# Patient Record
Sex: FEMALE | Race: WHITE | NOT HISPANIC OR LATINO | Employment: FULL TIME | URBAN - METROPOLITAN AREA
[De-identification: names, ages, dates, MRNs, and addresses within clinical notes are randomized per-mention and may not be internally consistent; named-entity substitution may affect disease eponyms.]

---

## 2017-04-04 ENCOUNTER — PATIENT MESSAGE (OUTPATIENT)
Dept: ENDOSCOPY | Facility: HOSPITAL | Age: 47
End: 2017-04-04

## 2017-04-12 ENCOUNTER — PATIENT MESSAGE (OUTPATIENT)
Dept: FAMILY MEDICINE | Facility: CLINIC | Age: 47
End: 2017-04-12

## 2017-08-10 LAB — HUMAN PAPILLOMAVIRUS (HPV): NORMAL

## 2017-10-10 ENCOUNTER — PATIENT MESSAGE (OUTPATIENT)
Dept: FAMILY MEDICINE | Facility: CLINIC | Age: 47
End: 2017-10-10

## 2017-10-10 ENCOUNTER — PATIENT MESSAGE (OUTPATIENT)
Dept: ENDOSCOPY | Facility: HOSPITAL | Age: 47
End: 2017-10-10

## 2017-10-10 NOTE — TELEPHONE ENCOUNTER
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   Will she have the diabetes listing removed?  Stephon

## 2018-01-18 ENCOUNTER — PATIENT MESSAGE (OUTPATIENT)
Dept: ADMINISTRATIVE | Facility: HOSPITAL | Age: 48
End: 2018-01-18

## 2018-01-26 ENCOUNTER — OFFICE VISIT (OUTPATIENT)
Dept: FAMILY MEDICINE | Facility: CLINIC | Age: 48
End: 2018-01-26
Payer: COMMERCIAL

## 2018-01-26 VITALS
TEMPERATURE: 98 F | SYSTOLIC BLOOD PRESSURE: 122 MMHG | WEIGHT: 152.56 LBS | HEIGHT: 67 IN | BODY MASS INDEX: 23.94 KG/M2 | HEART RATE: 94 BPM | DIASTOLIC BLOOD PRESSURE: 81 MMHG

## 2018-01-26 DIAGNOSIS — M06.011 RHEUMATOID ARTHRITIS INVOLVING BOTH SHOULDERS WITH NEGATIVE RHEUMATOID FACTOR: ICD-10-CM

## 2018-01-26 DIAGNOSIS — Z00.00 ANNUAL PHYSICAL EXAM: Primary | ICD-10-CM

## 2018-01-26 DIAGNOSIS — M06.012 RHEUMATOID ARTHRITIS INVOLVING BOTH SHOULDERS WITH NEGATIVE RHEUMATOID FACTOR: ICD-10-CM

## 2018-01-26 PROCEDURE — 90715 TDAP VACCINE 7 YRS/> IM: CPT | Mod: S$GLB,,, | Performed by: FAMILY MEDICINE

## 2018-01-26 PROCEDURE — 90471 IMMUNIZATION ADMIN: CPT | Mod: S$GLB,,, | Performed by: FAMILY MEDICINE

## 2018-01-26 PROCEDURE — 99396 PREV VISIT EST AGE 40-64: CPT | Mod: 25,S$GLB,, | Performed by: FAMILY MEDICINE

## 2018-01-26 PROCEDURE — 99999 PR PBB SHADOW E&M-EST. PATIENT-LVL IV: CPT | Mod: PBBFAC,,, | Performed by: FAMILY MEDICINE

## 2018-01-26 RX ORDER — CYCLOBENZAPRINE HCL 10 MG
TABLET ORAL
COMMUNITY
Start: 2017-12-01

## 2018-01-26 RX ORDER — METHOCARBAMOL 750 MG/1
TABLET, FILM COATED ORAL
COMMUNITY
Start: 2017-12-04 | End: 2023-05-05

## 2018-01-26 NOTE — PROGRESS NOTES
Subjective:      Patient ID: Arleen Saavedra is a 47 y.o. female.    Chief Complaint: Annual Exam    HPI  She is here for an annual exam.     Problem List Items Addressed This Visit     Rheumatoid arthritis involving both shoulders with negative rheumatoid factor    Overview     She has seronegative rheumatoid arthritis and fibromyalgia. Reports soreness in the upper shoulders and neck which she has done well within the past with plaquenil which she has been on since around 2010 and is followed by Dr. Martin.               Other Visit Diagnoses     Annual physical exam    -  Primary          Health Maintenance Due   Topic Date Due    TETANUS VACCINE  07/14/1989    Lipid Panel  01/15/2018       Past Medical History:  Past Medical History:   Diagnosis Date    Fibromyalgia     Rheumatoid arthritis     Rheumatoid arthritis involving both shoulders with negative rheumatoid factor 1/26/2018    She has seronegative rheumatoid arthritis and fibromyalgia. Reports soreness in the upper shoulders and neck which she has done well within the past with plaquenil which she has been on since around 2010 and is followed by Dr. Martin.       History reviewed. No pertinent surgical history.  Review of patient's allergies indicates:  No Known Allergies  Current Outpatient Prescriptions on File Prior to Visit   Medication Sig Dispense Refill    hydroxychloroquine (PLAQUENIL) 200 mg tablet Take 1 tablet (200 mg total) by mouth 2 (two) times daily. 60 tablet 0     No current facility-administered medications on file prior to visit.      Social History     Social History    Marital status:      Spouse name: N/A    Number of children: N/A    Years of education: N/A     Occupational History    Not on file.     Social History Main Topics    Smoking status: Never Smoker    Smokeless tobacco: Never Used    Alcohol use Not on file    Drug use: Unknown    Sexual activity: Not on file     Other Topics Concern     "Not on file     Social History Narrative    No narrative on file     Family History   Problem Relation Age of Onset    Stroke Mother     Hypertension Mother     Seizures Mother     Coronary artery disease Father     Hyperlipidemia Father     Hypertension Maternal Grandfather     Diabetes Neg Hx     Cancer Neg Hx              Review of Systems   Constitutional: Negative for activity change and unexpected weight change.   HENT: Negative for hearing loss, rhinorrhea and trouble swallowing.    Eyes: Negative for discharge and visual disturbance.   Respiratory: Negative for chest tightness and wheezing.    Cardiovascular: Negative for chest pain and palpitations.   Gastrointestinal: Negative for blood in stool, constipation, diarrhea and vomiting.   Endocrine: Negative for polydipsia and polyuria.   Genitourinary: Negative for difficulty urinating, dysuria, hematuria and menstrual problem.   Musculoskeletal: Positive for arthralgias. Negative for joint swelling and neck pain.   Neurological: Negative for weakness and headaches.   Psychiatric/Behavioral: Negative for confusion and dysphoric mood.       Objective:   /81   Pulse 94   Temp 98.4 °F (36.9 °C) (Oral)   Ht 5' 7" (1.702 m)   Wt 69.2 kg (152 lb 8.9 oz)   BMI 23.89 kg/m²     Physical Exam   Constitutional: She appears well-developed and well-nourished. She is cooperative.   HENT:   Head: Normocephalic and atraumatic.   Right Ear: Tympanic membrane, external ear and ear canal normal.   Left Ear: Tympanic membrane, external ear and ear canal normal.   Nose: Nose normal.   Mouth/Throat: Uvula is midline and mucous membranes are normal. No oral lesions. No oropharyngeal exudate, posterior oropharyngeal edema or posterior oropharyngeal erythema.   Eyes: EOM and lids are normal. Pupils are equal, round, and reactive to light. Right eye exhibits no discharge. Left eye exhibits no discharge. Right conjunctiva is not injected. Right conjunctiva has no " hemorrhage. Left conjunctiva is not injected. Left conjunctiva has no hemorrhage. No scleral icterus. Right eye exhibits no nystagmus. Left eye exhibits no nystagmus.   Neck: Normal range of motion and full passive range of motion without pain. Neck supple. No JVD present. No tracheal tenderness present. Carotid bruit is not present. No tracheal deviation present. No thyroid mass and no thyromegaly present.   Cardiovascular: Normal rate, regular rhythm, S1 normal and S2 normal.    No murmur heard.  Pulses:       Carotid pulses are 2+ on the right side, and 2+ on the left side.       Radial pulses are 2+ on the right side, and 2+ on the left side.        Posterior tibial pulses are 2+ on the right side, and 2+ on the left side.   Pulmonary/Chest: Effort normal and breath sounds normal. No respiratory distress. She has no wheezes. She has no rhonchi. She has no rales.   Abdominal: Soft. Normal appearance, normal aorta and bowel sounds are normal. She exhibits no distension, no abdominal bruit, no pulsatile midline mass and no mass. There is no hepatosplenomegaly. There is no tenderness. There is no rebound.   Musculoskeletal:        Right knee: She exhibits no swelling. No tenderness found.        Left knee: She exhibits no swelling. No tenderness found.   Lymphadenopathy:        Head (right side): No submental and no submandibular adenopathy present.        Head (left side): No submental and no submandibular adenopathy present.     She has no cervical adenopathy.   Neurological: She is alert. She has normal strength. No cranial nerve deficit or sensory deficit.   Skin: Skin is warm and dry. No rash noted. No cyanosis. Nails show no clubbing.   Psychiatric: She has a normal mood and affect. Her speech is normal and behavior is normal. Thought content normal. Cognition and memory are normal.       Assessment:     1. Annual physical exam    2. Rheumatoid arthritis involving both shoulders with negative rheumatoid factor         Plan:   Arleen was seen today for annual exam.    Diagnoses and all orders for this visit:    Annual physical exam  -     Tdap Vaccine    Rheumatoid arthritis involving both shoulders with negative rheumatoid factor    she is going to get a lipid panel done at [facility].

## 2018-02-02 LAB
CHOLEST SERPL-MSCNC: 128 MG/DL (ref 0–200)
HDL/CHOLESTEROL RATIO: 2.3 % (ref 0–4.5)
HDLC SERPL-MCNC: 56 MG/DL (ref 35–70)
LDLC SERPL CALC-MCNC: 64 MG/DL (ref 0–109)
TRIGL SERPL-MCNC: 38 MG/DL (ref 0–150)

## 2019-03-11 ENCOUNTER — OFFICE VISIT (OUTPATIENT)
Dept: FAMILY MEDICINE | Facility: CLINIC | Age: 49
End: 2019-03-11
Payer: COMMERCIAL

## 2019-03-11 VITALS
TEMPERATURE: 99 F | BODY MASS INDEX: 25.39 KG/M2 | WEIGHT: 158 LBS | DIASTOLIC BLOOD PRESSURE: 79 MMHG | SYSTOLIC BLOOD PRESSURE: 130 MMHG | HEART RATE: 100 BPM | HEIGHT: 66 IN

## 2019-03-11 DIAGNOSIS — M06.011 RHEUMATOID ARTHRITIS INVOLVING BOTH SHOULDERS WITH NEGATIVE RHEUMATOID FACTOR: ICD-10-CM

## 2019-03-11 DIAGNOSIS — Z00.00 ANNUAL PHYSICAL EXAM: Primary | ICD-10-CM

## 2019-03-11 DIAGNOSIS — M06.012 RHEUMATOID ARTHRITIS INVOLVING BOTH SHOULDERS WITH NEGATIVE RHEUMATOID FACTOR: ICD-10-CM

## 2019-03-11 PROCEDURE — 99999 PR PBB SHADOW E&M-EST. PATIENT-LVL III: ICD-10-PCS | Mod: PBBFAC,,, | Performed by: FAMILY MEDICINE

## 2019-03-11 PROCEDURE — 99999 PR PBB SHADOW E&M-EST. PATIENT-LVL III: CPT | Mod: PBBFAC,,, | Performed by: FAMILY MEDICINE

## 2019-03-11 PROCEDURE — 99396 PREV VISIT EST AGE 40-64: CPT | Mod: S$GLB,,, | Performed by: FAMILY MEDICINE

## 2019-03-11 PROCEDURE — 99396 PR PREVENTIVE VISIT,EST,40-64: ICD-10-PCS | Mod: S$GLB,,, | Performed by: FAMILY MEDICINE

## 2019-03-11 NOTE — PROGRESS NOTES
Subjective:      Patient ID: Arleen Saavedra is a 48 y.o. female.    Chief Complaint: Annual Exam    HPI  She is here for an annual exam.     Problem List Items Addressed This Visit     None      she has been following with Dr. Martin for her seronegative RA. She has been stable with him and she is not having to use steroids.    The current medication list that we have since it was last reconciled is as follows:  Current Outpatient Medications on File Prior to Visit   Medication Sig Dispense Refill    cyclobenzaprine (FLEXERIL) 10 MG tablet TAKE 1 TABLET NIGHTLY      hydroxychloroquine (PLAQUENIL) 200 mg tablet Take 1 tablet (200 mg total) by mouth 2 (two) times daily. 60 tablet 0    methocarbamol (ROBAXIN) 750 MG Tab        No current facility-administered medications on file prior to visit.      The above are being given by him.   I reivewed her last cmp and lipid and cbc.  Her LDL was great last year.      Health Maintenance Due   Topic Date Due    Lipid Panel  01/15/2018       Past Medical History:  Past Medical History:   Diagnosis Date    Fibromyalgia     Rheumatoid arthritis     Rheumatoid arthritis involving both shoulders with negative rheumatoid factor 1/26/2018    She has seronegative rheumatoid arthritis and fibromyalgia. Reports soreness in the upper shoulders and neck which she has done well within the past with plaquenil which she has been on since around 2010 and is followed by Dr. Martin.       History reviewed. No pertinent surgical history.  Review of patient's allergies indicates:  No Known Allergies  Current Outpatient Medications on File Prior to Visit   Medication Sig Dispense Refill    cyclobenzaprine (FLEXERIL) 10 MG tablet TAKE 1 TABLET NIGHTLY      hydroxychloroquine (PLAQUENIL) 200 mg tablet Take 1 tablet (200 mg total) by mouth 2 (two) times daily. 60 tablet 0    methocarbamol (ROBAXIN) 750 MG Tab        No current facility-administered medications on file prior to  "visit.      Social History     Socioeconomic History    Marital status:      Spouse name: Not on file    Number of children: Not on file    Years of education: Not on file    Highest education level: Not on file   Social Needs    Financial resource strain: Not on file    Food insecurity - worry: Not on file    Food insecurity - inability: Not on file    Transportation needs - medical: Not on file    Transportation needs - non-medical: Not on file   Occupational History    Not on file   Tobacco Use    Smoking status: Never Smoker    Smokeless tobacco: Never Used   Substance and Sexual Activity    Alcohol use: Not on file    Drug use: Not on file    Sexual activity: Not on file   Other Topics Concern    Not on file   Social History Narrative    Not on file     Family History   Problem Relation Age of Onset    Stroke Mother     Hypertension Mother     Seizures Mother     Coronary artery disease Father     Hyperlipidemia Father     Hypertension Maternal Grandfather     Diabetes Neg Hx     Cancer Neg Hx              Review of Systems   Constitutional: Negative for activity change and unexpected weight change.   HENT: Negative for hearing loss, rhinorrhea and trouble swallowing.    Eyes: Negative for discharge and visual disturbance.   Respiratory: Negative for chest tightness and wheezing.    Cardiovascular: Negative for chest pain and palpitations.   Gastrointestinal: Negative for blood in stool, constipation, diarrhea and vomiting.   Endocrine: Negative for polydipsia and polyuria.   Genitourinary: Negative for difficulty urinating, dysuria, hematuria and menstrual problem.   Musculoskeletal: Positive for arthralgias. Negative for joint swelling and neck pain.   Neurological: Negative for weakness and headaches.   Psychiatric/Behavioral: Negative for confusion and dysphoric mood.       Objective:   /79   Pulse 100   Temp 98.5 °F (36.9 °C) (Oral)   Ht 5' 6" (1.676 m)   Wt 71.7 " kg (158 lb)   LMP 02/23/2019 (Exact Date)   BMI 25.50 kg/m²     Physical Exam   Constitutional: She appears well-developed and well-nourished. She is cooperative.   HENT:   Head: Normocephalic and atraumatic.   Right Ear: Tympanic membrane, external ear and ear canal normal.   Left Ear: Tympanic membrane, external ear and ear canal normal.   Nose: Nose normal.   Mouth/Throat: Uvula is midline and mucous membranes are normal. No oral lesions. No oropharyngeal exudate, posterior oropharyngeal edema or posterior oropharyngeal erythema.   Eyes: EOM and lids are normal. Pupils are equal, round, and reactive to light. Right eye exhibits no discharge. Left eye exhibits no discharge. Right conjunctiva is not injected. Right conjunctiva has no hemorrhage. Left conjunctiva is not injected. Left conjunctiva has no hemorrhage. No scleral icterus. Right eye exhibits no nystagmus. Left eye exhibits no nystagmus.   Neck: Normal range of motion and full passive range of motion without pain. Neck supple. No JVD present. No tracheal tenderness present. Carotid bruit is not present. No tracheal deviation present. No thyroid mass and no thyromegaly present.   Cardiovascular: Normal rate, regular rhythm, S1 normal and S2 normal.   No murmur heard.  Pulses:       Carotid pulses are 2+ on the right side, and 2+ on the left side.       Radial pulses are 2+ on the right side, and 2+ on the left side.        Posterior tibial pulses are 2+ on the right side, and 2+ on the left side.   Pulmonary/Chest: Effort normal and breath sounds normal. No respiratory distress. She has no wheezes. She has no rhonchi. She has no rales.   Abdominal: Soft. Normal appearance, normal aorta and bowel sounds are normal. She exhibits no distension, no abdominal bruit, no pulsatile midline mass and no mass. There is no hepatosplenomegaly. There is no tenderness. There is no rebound.   Musculoskeletal:        Right knee: She exhibits no swelling. No tenderness  found.        Left knee: She exhibits no swelling. No tenderness found.   Lymphadenopathy:        Head (right side): No submental and no submandibular adenopathy present.        Head (left side): No submental and no submandibular adenopathy present.     She has no cervical adenopathy.   Neurological: She is alert. She has normal strength. No cranial nerve deficit or sensory deficit.   Skin: Skin is warm and dry. No rash noted. No cyanosis. Nails show no clubbing.   Psychiatric: She has a normal mood and affect. Her speech is normal and behavior is normal. Thought content normal. Cognition and memory are normal.       Assessment:     1. Annual physical exam    2. Rheumatoid arthritis involving both shoulders with negative rheumatoid factor        Plan:   Arleen was seen today for annual exam.    Diagnoses and all orders for this visit:    Annual physical exam    Rheumatoid arthritis involving both shoulders with negative rheumatoid factor    I do not feel that she needs any blood at this point as her last lipid was wonderful and her electrolytes and cbc were good in December. She is not due for any shots. She is going to get her pap report that was done in January.

## 2020-09-04 DIAGNOSIS — Z12.39 BREAST CANCER SCREENING: ICD-10-CM

## 2020-10-06 ENCOUNTER — PATIENT MESSAGE (OUTPATIENT)
Dept: ADMINISTRATIVE | Facility: HOSPITAL | Age: 50
End: 2020-10-06

## 2021-03-25 ENCOUNTER — PATIENT MESSAGE (OUTPATIENT)
Dept: ADMINISTRATIVE | Facility: HOSPITAL | Age: 51
End: 2021-03-25

## 2021-06-25 ENCOUNTER — OFFICE VISIT (OUTPATIENT)
Dept: FAMILY MEDICINE | Facility: CLINIC | Age: 51
End: 2021-06-25
Payer: COMMERCIAL

## 2021-06-25 ENCOUNTER — PATIENT OUTREACH (OUTPATIENT)
Dept: ADMINISTRATIVE | Facility: HOSPITAL | Age: 51
End: 2021-06-25

## 2021-06-25 VITALS
TEMPERATURE: 98 F | SYSTOLIC BLOOD PRESSURE: 135 MMHG | HEART RATE: 99 BPM | HEIGHT: 66 IN | DIASTOLIC BLOOD PRESSURE: 81 MMHG | BODY MASS INDEX: 25.23 KG/M2 | WEIGHT: 157 LBS

## 2021-06-25 DIAGNOSIS — Z00.00 ANNUAL PHYSICAL EXAM: Primary | ICD-10-CM

## 2021-06-25 DIAGNOSIS — M85.80 OSTEOPENIA, UNSPECIFIED LOCATION: ICD-10-CM

## 2021-06-25 DIAGNOSIS — Z13.220 ENCOUNTER FOR LIPID SCREENING FOR CARDIOVASCULAR DISEASE: ICD-10-CM

## 2021-06-25 DIAGNOSIS — Z13.6 ENCOUNTER FOR LIPID SCREENING FOR CARDIOVASCULAR DISEASE: ICD-10-CM

## 2021-06-25 DIAGNOSIS — R00.0 TACHYCARDIA: ICD-10-CM

## 2021-06-25 PROBLEM — M89.9 OSTEOPATHIA: Status: ACTIVE | Noted: 2021-06-25

## 2021-06-25 PROCEDURE — 99396 PREV VISIT EST AGE 40-64: CPT | Mod: S$GLB,,, | Performed by: FAMILY MEDICINE

## 2021-06-25 PROCEDURE — 3008F PR BODY MASS INDEX (BMI) DOCUMENTED: ICD-10-PCS | Mod: CPTII,S$GLB,, | Performed by: FAMILY MEDICINE

## 2021-06-25 PROCEDURE — 3008F BODY MASS INDEX DOCD: CPT | Mod: CPTII,S$GLB,, | Performed by: FAMILY MEDICINE

## 2021-06-25 PROCEDURE — 99999 PR PBB SHADOW E&M-EST. PATIENT-LVL III: ICD-10-PCS | Mod: PBBFAC,,, | Performed by: FAMILY MEDICINE

## 2021-06-25 PROCEDURE — 1126F AMNT PAIN NOTED NONE PRSNT: CPT | Mod: S$GLB,,, | Performed by: FAMILY MEDICINE

## 2021-06-25 PROCEDURE — 99999 PR PBB SHADOW E&M-EST. PATIENT-LVL III: CPT | Mod: PBBFAC,,, | Performed by: FAMILY MEDICINE

## 2021-06-25 PROCEDURE — 99396 PR PREVENTIVE VISIT,EST,40-64: ICD-10-PCS | Mod: S$GLB,,, | Performed by: FAMILY MEDICINE

## 2021-06-25 PROCEDURE — 1126F PR PAIN SEVERITY QUANTIFIED, NO PAIN PRESENT: ICD-10-PCS | Mod: S$GLB,,, | Performed by: FAMILY MEDICINE

## 2021-07-02 ENCOUNTER — PATIENT MESSAGE (OUTPATIENT)
Dept: FAMILY MEDICINE | Facility: CLINIC | Age: 51
End: 2021-07-02

## 2021-07-13 ENCOUNTER — PATIENT MESSAGE (OUTPATIENT)
Dept: FAMILY MEDICINE | Facility: CLINIC | Age: 51
End: 2021-07-13

## 2021-07-14 ENCOUNTER — PATIENT MESSAGE (OUTPATIENT)
Dept: FAMILY MEDICINE | Facility: CLINIC | Age: 51
End: 2021-07-14

## 2021-07-19 ENCOUNTER — PATIENT MESSAGE (OUTPATIENT)
Dept: FAMILY MEDICINE | Facility: CLINIC | Age: 51
End: 2021-07-19

## 2021-07-30 ENCOUNTER — PATIENT MESSAGE (OUTPATIENT)
Dept: FAMILY MEDICINE | Facility: CLINIC | Age: 51
End: 2021-07-30

## 2022-03-18 ENCOUNTER — PATIENT MESSAGE (OUTPATIENT)
Dept: ADMINISTRATIVE | Facility: HOSPITAL | Age: 52
End: 2022-03-18
Payer: COMMERCIAL

## 2022-04-25 ENCOUNTER — PATIENT MESSAGE (OUTPATIENT)
Dept: ADMINISTRATIVE | Facility: HOSPITAL | Age: 52
End: 2022-04-25
Payer: COMMERCIAL

## 2022-04-25 DIAGNOSIS — Z12.11 SCREENING FOR COLON CANCER: ICD-10-CM

## 2022-04-27 ENCOUNTER — PATIENT MESSAGE (OUTPATIENT)
Dept: ADMINISTRATIVE | Facility: HOSPITAL | Age: 52
End: 2022-04-27
Payer: COMMERCIAL

## 2022-05-18 LAB — HEMOCCULT STL QL IA: NEGATIVE

## 2022-05-25 DIAGNOSIS — Z12.31 OTHER SCREENING MAMMOGRAM: ICD-10-CM

## 2022-05-26 ENCOUNTER — PATIENT MESSAGE (OUTPATIENT)
Dept: FAMILY MEDICINE | Facility: CLINIC | Age: 52
End: 2022-05-26
Payer: COMMERCIAL

## 2022-07-08 ENCOUNTER — PATIENT MESSAGE (OUTPATIENT)
Dept: FAMILY MEDICINE | Facility: CLINIC | Age: 52
End: 2022-07-08
Payer: COMMERCIAL

## 2022-09-09 ENCOUNTER — OFFICE VISIT (OUTPATIENT)
Dept: FAMILY MEDICINE | Facility: CLINIC | Age: 52
End: 2022-09-09
Payer: COMMERCIAL

## 2022-09-09 VITALS
WEIGHT: 156.75 LBS | HEIGHT: 66 IN | DIASTOLIC BLOOD PRESSURE: 75 MMHG | TEMPERATURE: 98 F | HEART RATE: 84 BPM | SYSTOLIC BLOOD PRESSURE: 123 MMHG | BODY MASS INDEX: 25.19 KG/M2

## 2022-09-09 DIAGNOSIS — Z00.00 ANNUAL PHYSICAL EXAM: Primary | ICD-10-CM

## 2022-09-09 PROCEDURE — 3008F PR BODY MASS INDEX (BMI) DOCUMENTED: ICD-10-PCS | Mod: CPTII,S$GLB,, | Performed by: FAMILY MEDICINE

## 2022-09-09 PROCEDURE — 3078F PR MOST RECENT DIASTOLIC BLOOD PRESSURE < 80 MM HG: ICD-10-PCS | Mod: CPTII,S$GLB,, | Performed by: FAMILY MEDICINE

## 2022-09-09 PROCEDURE — 3008F BODY MASS INDEX DOCD: CPT | Mod: CPTII,S$GLB,, | Performed by: FAMILY MEDICINE

## 2022-09-09 PROCEDURE — 99999 PR PBB SHADOW E&M-EST. PATIENT-LVL III: ICD-10-PCS | Mod: PBBFAC,,, | Performed by: FAMILY MEDICINE

## 2022-09-09 PROCEDURE — 1159F MED LIST DOCD IN RCRD: CPT | Mod: CPTII,S$GLB,, | Performed by: FAMILY MEDICINE

## 2022-09-09 PROCEDURE — 99396 PREV VISIT EST AGE 40-64: CPT | Mod: S$GLB,,, | Performed by: FAMILY MEDICINE

## 2022-09-09 PROCEDURE — 3074F SYST BP LT 130 MM HG: CPT | Mod: CPTII,S$GLB,, | Performed by: FAMILY MEDICINE

## 2022-09-09 PROCEDURE — 3074F PR MOST RECENT SYSTOLIC BLOOD PRESSURE < 130 MM HG: ICD-10-PCS | Mod: CPTII,S$GLB,, | Performed by: FAMILY MEDICINE

## 2022-09-09 PROCEDURE — 99999 PR PBB SHADOW E&M-EST. PATIENT-LVL III: CPT | Mod: PBBFAC,,, | Performed by: FAMILY MEDICINE

## 2022-09-09 PROCEDURE — 99396 PR PREVENTIVE VISIT,EST,40-64: ICD-10-PCS | Mod: S$GLB,,, | Performed by: FAMILY MEDICINE

## 2022-09-09 PROCEDURE — 1159F PR MEDICATION LIST DOCUMENTED IN MEDICAL RECORD: ICD-10-PCS | Mod: CPTII,S$GLB,, | Performed by: FAMILY MEDICINE

## 2022-09-09 PROCEDURE — 3078F DIAST BP <80 MM HG: CPT | Mod: CPTII,S$GLB,, | Performed by: FAMILY MEDICINE

## 2022-09-09 RX ORDER — DICLOFENAC SODIUM 30 MG/G
GEL TOPICAL
COMMUNITY
Start: 2022-07-06

## 2022-09-09 RX ORDER — IBUPROFEN 800 MG/1
1 TABLET ORAL EVERY 8 HOURS PRN
COMMUNITY
Start: 2021-01-24

## 2022-09-09 NOTE — PROGRESS NOTES
Subjective:      Patient ID: Arleen Saavedra is a 52 y.o. female.    Chief Complaint: Annual Exam  She is here for a phsyical. She is still seeing her rheumatologist at [facility].  She is going to get her pap from Dr. Hernandez in [place] and we will request a report. She has her mammograms at [facility] and will get that to me.   Problem List Items Addressed This Visit    None  Visit Diagnoses       Annual physical exam    -  Primary    Relevant Orders    Hepatitis C Antibody    HIV 1/2 Ag/Ab (4th Gen)    Lipid Panel            The patient's Health Maintenance was reviewed and the following appears to be due:   Health Maintenance Due   Topic Date Due    Hepatitis C Screening  Never done    HIV Screening  Never done    Shingles Vaccine (1 of 2) Never done    Mammogram  05/14/2022    Cervical Cancer Screening  08/10/2022    Influenza Vaccine (1) 09/01/2022       Past Medical History:  Past Medical History:   Diagnosis Date    Darier disease     Fibromyalgia     Rheumatoid arthritis     Rheumatoid arthritis involving both shoulders with negative rheumatoid factor 01/26/2018    She has seronegative rheumatoid arthritis and fibromyalgia. Reports soreness in the upper shoulders and neck which she has done well within the past with plaquenil which she has been on since around 2010 and is followed by Dr. Martin.       History reviewed. No pertinent surgical history.  Review of patient's allergies indicates:  No Known Allergies  Current Outpatient Medications on File Prior to Visit   Medication Sig Dispense Refill    APPLE CIDER VINEGAR ORAL Take 500 mg by mouth 2 (two) times daily.      calcium carb/vit D3/minerals (CALTRATE 600+D PLUS MINERALS ORAL)       cyclobenzaprine (FLEXERIL) 10 MG tablet TAKE 1 TABLET NIGHTLY      diclofenac sodium (SOLARAZE) 3 % gel SMARTSIG:Sparingly Topical Twice Daily      hydroxychloroquine (PLAQUENIL) 200 mg tablet Take 1 tablet (200 mg total) by mouth 2 (two) times daily. 60 tablet 0     "ibuprofen (ADVIL,MOTRIN) 800 MG tablet Take 1 tablet by mouth every 8 (eight) hours as needed.      methocarbamol (ROBAXIN) 750 MG Tab        No current facility-administered medications on file prior to visit.     Social History     Socioeconomic History    Marital status:    Tobacco Use    Smoking status: Never    Smokeless tobacco: Never     Family History   Problem Relation Age of Onset    Stroke Mother     Hypertension Mother     Seizures Mother     Coronary artery disease Father     Hyperlipidemia Father     Hypertension Maternal Grandfather     Diabetes Neg Hx     Cancer Neg Hx        Review of Systems   Constitutional:  Negative for activity change and unexpected weight change.   HENT:  Negative for hearing loss, rhinorrhea and trouble swallowing.    Eyes:  Negative for discharge and visual disturbance.   Respiratory:  Negative for chest tightness and wheezing.    Cardiovascular:  Negative for chest pain and palpitations.   Gastrointestinal:  Negative for blood in stool, constipation, diarrhea and vomiting.   Endocrine: Negative for polydipsia and polyuria.   Genitourinary:  Negative for difficulty urinating, dysuria, hematuria and menstrual problem.   Musculoskeletal:  Positive for arthralgias (she has been in massage for this and she is going to go to a chirpractor but she may consider going to PT for dry needling and will contact me in the future about this.). Negative for joint swelling and neck pain.   Neurological:  Negative for weakness and headaches.   Psychiatric/Behavioral:  Negative for confusion and dysphoric mood.      Objective:   /75   Pulse 84   Temp 98 °F (36.7 °C) (Oral)   Ht 5' 6" (1.676 m)   Wt 71.1 kg (156 lb 12 oz)   BMI 25.30 kg/m²     Physical Exam  Constitutional:       Appearance: Normal appearance. She is well-developed.   HENT:      Head: Normocephalic and atraumatic.      Right Ear: Tympanic membrane, ear canal and external ear normal.      Left Ear: Tympanic " membrane, ear canal and external ear normal.      Nose: Nose normal.      Mouth/Throat:      Mouth: No oral lesions.      Pharynx: Uvula midline. No oropharyngeal exudate or posterior oropharyngeal erythema.   Eyes:      General: Lids are normal. No scleral icterus.        Right eye: No discharge.         Left eye: No discharge.      Extraocular Movements:      Right eye: No nystagmus.      Left eye: No nystagmus.      Conjunctiva/sclera:      Right eye: Right conjunctiva is not injected. No hemorrhage.     Left eye: Left conjunctiva is not injected. No hemorrhage.     Pupils: Pupils are equal, round, and reactive to light.   Neck:      Thyroid: No thyroid mass or thyromegaly.      Vascular: No carotid bruit or JVD.      Trachea: No tracheal tenderness or tracheal deviation.   Cardiovascular:      Rate and Rhythm: Normal rate and regular rhythm.      Pulses:           Carotid pulses are 2+ on the right side and 2+ on the left side.       Radial pulses are 2+ on the right side and 2+ on the left side.        Posterior tibial pulses are 2+ on the right side and 2+ on the left side.      Heart sounds: S1 normal and S2 normal. No murmur heard.  Pulmonary:      Effort: Pulmonary effort is normal. No respiratory distress.      Breath sounds: Normal breath sounds. No wheezing, rhonchi or rales.   Abdominal:      General: Bowel sounds are normal. There is no distension or abdominal bruit.      Palpations: Abdomen is soft. There is no mass or pulsatile mass.      Tenderness: There is no abdominal tenderness. There is no rebound.   Musculoskeletal:      Cervical back: Full passive range of motion without pain, normal range of motion and neck supple.      Right hip: Normal range of motion.      Left hip: Normal range of motion.      Right knee: No swelling. No tenderness.      Left knee: No swelling. No tenderness.   Lymphadenopathy:      Head:      Right side of head: No submental or submandibular adenopathy.      Left side  of head: No submental or submandibular adenopathy.      Cervical: No cervical adenopathy.   Skin:     General: Skin is warm and dry.      Findings: No rash.      Nails: There is no clubbing.   Neurological:      Mental Status: She is alert.      Cranial Nerves: No cranial nerve deficit.      Sensory: No sensory deficit.   Psychiatric:         Speech: Speech normal.         Behavior: Behavior normal. Behavior is cooperative.         Thought Content: Thought content normal.     Assessment:     1. Annual physical exam      Plan:   I am having Arleen Saavedra maintain her hydrOXYchloroQUINE, cyclobenzaprine, methocarbamoL, APPLE CIDER VINEGAR ORAL, calcium carb/vit D3/minerals (CALTRATE 600+D PLUS MINERALS ORAL), diclofenac sodium, and ibuprofen.  Problem List Items Addressed This Visit    None  Visit Diagnoses       Annual physical exam    -  Primary    Relevant Orders    Hepatitis C Antibody    HIV 1/2 Ag/Ab (4th Gen)    Lipid Panel          Follow up for get pap report..    Arleen was seen today for annual exam.    Diagnoses and all orders for this visit:    Annual physical exam  -     Hepatitis C Antibody; Future  -     HIV 1/2 Ag/Ab (4th Gen); Future  -     Lipid Panel; Future  -     Hepatitis C Antibody  -     HIV 1/2 Ag/Ab (4th Gen)  -     Lipid Panel         The patient was instructed to stop the following meds:  There are no discontinued medications.  Orders Placed This Encounter   Procedures    Hepatitis C Antibody     Standing Status:   Future     Number of Occurrences:   1     Standing Expiration Date:   9/9/2023     Order Specific Question:   Release to patient     Answer:   Immediate    HIV 1/2 Ag/Ab (4th Gen)     Standing Status:   Future     Number of Occurrences:   1     Standing Expiration Date:   11/9/2023    Lipid Panel     Standing Status:   Future     Number of Occurrences:   1     Standing Expiration Date:   9/9/2023       Medication List with Changes/Refills   Current Medications    APPLE  CIDER VINEGAR ORAL    Take 500 mg by mouth 2 (two) times daily.    CALCIUM CARB/VIT D3/MINERALS (CALTRATE 600+D PLUS MINERALS ORAL)        CYCLOBENZAPRINE (FLEXERIL) 10 MG TABLET    TAKE 1 TABLET NIGHTLY    DICLOFENAC SODIUM (SOLARAZE) 3 % GEL    SMARTSIG:Sparingly Topical Twice Daily    HYDROXYCHLOROQUINE (PLAQUENIL) 200 MG TABLET    Take 1 tablet (200 mg total) by mouth 2 (two) times daily.    IBUPROFEN (ADVIL,MOTRIN) 800 MG TABLET    Take 1 tablet by mouth every 8 (eight) hours as needed.    METHOCARBAMOL (ROBAXIN) 750 MG TAB          Medication List with Changes/Refills   Current Medications    APPLE CIDER VINEGAR ORAL    Take 500 mg by mouth 2 (two) times daily.       Start Date: --        End Date: --    CALCIUM CARB/VIT D3/MINERALS (CALTRATE 600+D PLUS MINERALS ORAL)           Start Date: 6/1/2021  End Date: --    CYCLOBENZAPRINE (FLEXERIL) 10 MG TABLET    TAKE 1 TABLET NIGHTLY       Start Date: 12/1/2017 End Date: --    DICLOFENAC SODIUM (SOLARAZE) 3 % GEL    SMARTSIG:Sparingly Topical Twice Daily       Start Date: 7/6/2022  End Date: --    HYDROXYCHLOROQUINE (PLAQUENIL) 200 MG TABLET    Take 1 tablet (200 mg total) by mouth 2 (two) times daily.       Start Date: 4/24/2015 End Date: --    IBUPROFEN (ADVIL,MOTRIN) 800 MG TABLET    Take 1 tablet by mouth every 8 (eight) hours as needed.       Start Date: 1/24/2021 End Date: --    METHOCARBAMOL (ROBAXIN) 750 MG TAB           Start Date: 12/4/2017 End Date: --

## 2022-09-12 ENCOUNTER — PATIENT MESSAGE (OUTPATIENT)
Dept: FAMILY MEDICINE | Facility: CLINIC | Age: 52
End: 2022-09-12
Payer: COMMERCIAL

## 2022-09-16 LAB
HEP C VIRUS AB: NON REACTIVE
HIV AG/AB 4TH GEN: NON REACTIVE

## 2022-09-17 ENCOUNTER — PATIENT MESSAGE (OUTPATIENT)
Dept: FAMILY MEDICINE | Facility: CLINIC | Age: 52
End: 2022-09-17
Payer: COMMERCIAL

## 2022-09-19 NOTE — TELEPHONE ENCOUNTER
The hepatitis C was negative.   The HiV is also negative.   The lipid panel is in control also.  These are all good numbers!    Lipid Panel  Specimen:  Blood - Blood specimen (specimen)   Ref Range & Units 2 d ago   Cholesterol 0 - 199 mg/dL 162    Triglyceride 0 - 199 mg/dL 52    HDL 29 - 89 mg/dL 66    LDL 0 - 109 mg/dL 86    Chol/HDL Ratio 0.0 - 4.5 2.5

## 2022-10-04 ENCOUNTER — PATIENT MESSAGE (OUTPATIENT)
Dept: ADMINISTRATIVE | Facility: HOSPITAL | Age: 52
End: 2022-10-04
Payer: COMMERCIAL

## 2022-12-23 ENCOUNTER — PATIENT MESSAGE (OUTPATIENT)
Dept: FAMILY MEDICINE | Facility: CLINIC | Age: 52
End: 2022-12-23
Payer: COMMERCIAL

## 2022-12-28 LAB — PAP RECOMMENDATION EXT: NORMAL

## 2023-01-04 LAB — BCS RECOMMENDATION EXT: NORMAL

## 2023-01-09 ENCOUNTER — PATIENT MESSAGE (OUTPATIENT)
Dept: FAMILY MEDICINE | Facility: CLINIC | Age: 53
End: 2023-01-09
Payer: COMMERCIAL

## 2023-01-09 RX ORDER — BACLOFEN 10 MG/1
10 TABLET ORAL 3 TIMES DAILY
COMMUNITY
Start: 2022-12-12 | End: 2023-07-07 | Stop reason: SDUPTHER

## 2023-01-09 NOTE — TELEPHONE ENCOUNTER
Mammo Digital CAD Screening    Anatomical Region Laterality Modality   Breast bilateral Mammography     Narrative    REASON FOR EXAM: [Z12.31]-Encounter for screening mammogram for malignant   neoplasm of breast     TECHNICAL FACTORS: Digital mammography with tomosynthesis was performed of   the breasts in the mediolateral oblique and craniocaudal views. CAD was   utilized. Exaggerated lateral craniocaudal views were obtained of the   breasts.     CLINICAL INFORMATION: This is a female patient for screening mammogram.   According to the [employer] [place] De[place] Model risk assessment   tool, her lifetime breast cancer risk is 9% .     COMPARISON: May 14, 2021 and February 4, 2019     FINDINGS: The breasts are heterogeneously dense, which may obscure small   masses. There is no evidence of suspicious mass, calcifications or   architectural distortion. Benign intramammary lymph node is present in the   left lateral breast.     IMPRESSION:   BI-RADS 2 - Benign     No mammographic findings of malignancy are identified. Annual mammography   is recommended.     BREAST DENSITY: Heterogeneously Dense     The patient has been entered into our radiology information system, and   she will receive notification approximately 30 days prior to the due date   of her next annual screening mammogram.     Electronically signed by Jose Saavedra MD on 1/5/2023 4:42 PM  Exam End: 01/04/23 15:42    Specimen Collected: 01/05/23 16:40 Last Resulted: 01/05/23 16:42   Received From: [facility]  Result Received: 01/09/23 11:50

## 2023-01-25 ENCOUNTER — PATIENT MESSAGE (OUTPATIENT)
Dept: ADMINISTRATIVE | Facility: HOSPITAL | Age: 53
End: 2023-01-25
Payer: COMMERCIAL

## 2023-01-25 NOTE — LETTER
AUTHORIZATION FOR RELEASE OF   CONFIDENTIAL INFORMATION    Dear Eliezer Simms II, MD,    We are seeing Arleen Saavedra, date of birth 1970, in the clinic at [facility]. Stephon Srinivasan MD is the patient's PCP. Arleen Saavedra has an outstanding lab/procedure at the time we reviewed her chart. In order to help keep her health information updated, she has authorized us to request the following medical record(s):        (  )  MAMMOGRAM                                      (  )  COLONOSCOPY      ( x )  PAP SMEAR                                          (  )  OUTSIDE LAB RESULTS     (  )  DEXA SCAN                                          (  )  EYE EXAM            (  )  FOOT EXAM                                          (  )  ENTIRE RECORD     (  )  OUTSIDE IMMUNIZATIONS                 (  )  _______________         Please fax records to [facility], Ted J Hudspeth, MD, [phone].     If you have any questions, please contact   Shreya SHARP LPN   Care Coordination   [facility]  Phone [phone]      Patient Name: Arleen Saavedra  : 1970  MRN [id number]  Patient Phone #: [phone]

## 2023-01-26 ENCOUNTER — PATIENT MESSAGE (OUTPATIENT)
Dept: ADMINISTRATIVE | Facility: HOSPITAL | Age: 53
End: 2023-01-26
Payer: COMMERCIAL

## 2023-04-15 ENCOUNTER — PATIENT MESSAGE (OUTPATIENT)
Dept: ADMINISTRATIVE | Facility: HOSPITAL | Age: 53
End: 2023-04-15
Payer: COMMERCIAL

## 2023-07-08 ENCOUNTER — PATIENT MESSAGE (OUTPATIENT)
Dept: ADMINISTRATIVE | Facility: HOSPITAL | Age: 53
End: 2023-07-08
Payer: COMMERCIAL

## 2023-07-09 DIAGNOSIS — Z12.11 SCREENING FOR COLON CANCER: ICD-10-CM

## 2023-08-16 LAB — HEMOCCULT STL QL IA: NEGATIVE

## 2024-05-08 ENCOUNTER — OFFICE VISIT (OUTPATIENT)
Dept: FAMILY MEDICINE | Facility: CLINIC | Age: 54
End: 2024-05-08
Payer: COMMERCIAL

## 2024-05-08 VITALS
SYSTOLIC BLOOD PRESSURE: 124 MMHG | HEIGHT: 66 IN | BODY MASS INDEX: 21.05 KG/M2 | HEART RATE: 87 BPM | DIASTOLIC BLOOD PRESSURE: 76 MMHG | WEIGHT: 131 LBS | RESPIRATION RATE: 14 BRPM

## 2024-05-08 DIAGNOSIS — Z00.00 ANNUAL PHYSICAL EXAM: Primary | ICD-10-CM

## 2024-05-08 PROCEDURE — 3074F SYST BP LT 130 MM HG: CPT | Mod: CPTII,S$GLB,, | Performed by: FAMILY MEDICINE

## 2024-05-08 PROCEDURE — 1159F MED LIST DOCD IN RCRD: CPT | Mod: CPTII,S$GLB,, | Performed by: FAMILY MEDICINE

## 2024-05-08 PROCEDURE — 99396 PREV VISIT EST AGE 40-64: CPT | Mod: S$GLB,,, | Performed by: FAMILY MEDICINE

## 2024-05-08 PROCEDURE — 99999 PR PBB SHADOW E&M-EST. PATIENT-LVL IV: CPT | Mod: PBBFAC,,, | Performed by: FAMILY MEDICINE

## 2024-05-08 PROCEDURE — 3008F BODY MASS INDEX DOCD: CPT | Mod: CPTII,S$GLB,, | Performed by: FAMILY MEDICINE

## 2024-05-08 PROCEDURE — 3078F DIAST BP <80 MM HG: CPT | Mod: CPTII,S$GLB,, | Performed by: FAMILY MEDICINE

## 2024-05-08 RX ORDER — NAPROXEN SODIUM 220 MG/1
81 TABLET, FILM COATED ORAL DAILY
COMMUNITY

## 2024-05-08 NOTE — PROGRESS NOTES
Subjective:      Patient ID: Arleen Saavedra is a 53 y.o. female.    Chief Complaint: Annual Exam    Problem List Items Addressed This Visit    None  Visit Diagnoses       Annual physical exam    -  Primary    Relevant Orders    Cologuard Screening (Multitarget Stool DNA)        She did recently get diagnosed with ductal carcinoma of the right breast.  She is planning her MRI and will pick a surgeon soon.      The patient's Health Maintenance was reviewed and the following appears to be due:   Health Maintenance Due   Topic Date Due    Shingles Vaccine (1 of 2) Never done    COVID-19 Vaccine (1 - 2023-24 season) Never done    Colorectal Cancer Screening  08/08/2024       Past Medical History:  Past Medical History:   Diagnosis Date    Darier disease     Fibromyalgia     Rheumatoid arthritis     Rheumatoid arthritis involving both shoulders with negative rheumatoid factor 01/26/2018    She has seronegative rheumatoid arthritis and fibromyalgia. Reports soreness in the upper shoulders and neck which she has done well within the past with plaquenil which she has been on since around 2010 and is followed by Dr. Martin.       No past surgical history on file.  Review of patient's allergies indicates:  No Known Allergies  Current Outpatient Medications on File Prior to Visit   Medication Sig Dispense Refill    aspirin 81 MG Chew Take 81 mg by mouth once daily.      baclofen (LIORESAL) 10 MG tablet Take 1 tablet by mouth 3 (three) times daily.      bimatoprost (LATISSE) 0.03 % ophthalmic solution Place 1 Application into both eyes every evening. Place one drop on applicator and apply evenly along the skin of the upper eyelid at base of eyelashes once daily at bedtime; repeat procedure for second eye (use a clean applicator). 5 mL 1    calcium carb/vit D3/minerals (CALTRATE 600+D PLUS MINERALS ORAL)       clindamycin (CLEOCIN T) 1 % external solution Apply topically 2 (two) times daily. Spot treatment 60 mL 1     cyclobenzaprine (FLEXERIL) 10 MG tablet TAKE 1 TABLET NIGHTLY      diclofenac sodium (SOLARAZE) 3 % gel SMARTSIG:Sparingly Topical Twice Daily      hydroxychloroquine (PLAQUENIL) 200 mg tablet Take 200 mg by mouth 2 (two) times daily.      ibuprofen (ADVIL,MOTRIN) 800 MG tablet Take 1 tablet by mouth every 8 (eight) hours as needed.      Lacto no.76/Bifido/FOS/larch (WOMEN'S PROBIOTIC ORAL)       methylcellulose, laxative, (CITRUCEL) 500 mg Tab       RESTASIS 0.05 % ophthalmic emulsion Place 1 drop into both eyes 2 (two) times daily.      spironolactone (ALDACTONE) 100 MG tablet Take 1 tablet (100 mg total) by mouth once daily. 90 tablet 3    tretinoin (RETIN-A) 0.025 % cream Apply pea sized amount to entire face QHS as tolerated. 45 g 6     No current facility-administered medications on file prior to visit.     Social History     Socioeconomic History    Marital status:    Tobacco Use    Smoking status: Former     Types: Cigarettes    Smokeless tobacco: Never     Social Determinants of Health     Financial Resource Strain: Low Risk  (5/6/2024)    Overall Financial Resource Strain (CARDIA)     Difficulty of Paying Living Expenses: Not hard at all   Food Insecurity: No Food Insecurity (5/6/2024)    Hunger Vital Sign     Worried About Running Out of Food in the Last Year: Never true     Ran Out of Food in the Last Year: Never true   Transportation Needs: No Transportation Needs (5/6/2024)    PRAPARE - Transportation     Lack of Transportation (Medical): No     Lack of Transportation (Non-Medical): No   Physical Activity: Insufficiently Active (5/6/2024)    Exercise Vital Sign     Days of Exercise per Week: 3 days     Minutes of Exercise per Session: 30 min   Stress: Stress Concern Present (5/6/2024)    [employer] [place] [employer] - Occupational Stress Questionnaire     Feeling of Stress : To some extent   Housing Stability: Unknown (5/6/2024)    Housing Stability Vital Sign     Unable to Pay for  "Housing in the Last Year: No     Family History   Problem Relation Name Age of Onset    Stroke Mother      Hypertension Mother      Seizures Mother      Coronary artery disease Father      Hyperlipidemia Father      Hypertension Maternal Grandfather      Diabetes Neg Hx      Cancer Neg Hx         Review of Systems   Constitutional:  Negative for activity change and unexpected weight change.   HENT:  Negative for hearing loss, rhinorrhea and trouble swallowing.    Eyes:  Negative for discharge and visual disturbance.   Respiratory:  Negative for chest tightness and wheezing.    Cardiovascular:  Negative for chest pain and palpitations.   Gastrointestinal:  Negative for blood in stool, constipation, diarrhea and vomiting.   Endocrine: Negative for polydipsia and polyuria.   Genitourinary:  Negative for difficulty urinating, dysuria, hematuria and menstrual problem.   Musculoskeletal:  Negative for arthralgias, joint swelling and neck pain.   Neurological:  Negative for weakness and headaches.   Psychiatric/Behavioral:  Negative for confusion and dysphoric mood.      Objective:   /76   Pulse 87   Resp 14   Ht 5' 6" (1.676 m)   Wt 59.4 kg (131 lb)   BMI 21.14 kg/m²     Physical Exam  Constitutional:       Appearance: Normal appearance. She is well-developed.   HENT:      Head: Normocephalic and atraumatic.      Right Ear: Tympanic membrane, ear canal and external ear normal.      Left Ear: Tympanic membrane, ear canal and external ear normal.      Nose: Nose normal.      Mouth/Throat:      Mouth: No oral lesions.      Pharynx: Uvula midline. No oropharyngeal exudate or posterior oropharyngeal erythema.   Eyes:      General: Lids are normal. No scleral icterus.        Right eye: No discharge.         Left eye: No discharge.      Extraocular Movements:      Right eye: No nystagmus.      Left eye: No nystagmus.      Conjunctiva/sclera:      Right eye: Right conjunctiva is not injected. No hemorrhage.     Left " eye: Left conjunctiva is not injected. No hemorrhage.     Pupils: Pupils are equal, round, and reactive to light.   Neck:      Thyroid: No thyroid mass or thyromegaly.      Vascular: No carotid bruit or JVD.      Trachea: No tracheal tenderness or tracheal deviation.   Cardiovascular:      Rate and Rhythm: Normal rate and regular rhythm.      Pulses:           Carotid pulses are 2+ on the right side and 2+ on the left side.       Radial pulses are 2+ on the right side and 2+ on the left side.        Posterior tibial pulses are 2+ on the right side and 2+ on the left side.      Heart sounds: S1 normal and S2 normal. No murmur heard.  Pulmonary:      Effort: Pulmonary effort is normal. No respiratory distress.      Breath sounds: Normal breath sounds. No wheezing, rhonchi or rales.   Abdominal:      General: Bowel sounds are normal. There is no distension or abdominal bruit.      Palpations: Abdomen is soft. There is no mass or pulsatile mass.      Tenderness: There is no abdominal tenderness. There is no rebound.   Musculoskeletal:      Cervical back: Full passive range of motion without pain, normal range of motion and neck supple.      Right knee: No swelling. No tenderness.      Left knee: No swelling. No tenderness.   Lymphadenopathy:      Head:      Right side of head: No submental or submandibular adenopathy.      Left side of head: No submental or submandibular adenopathy.      Cervical: No cervical adenopathy.   Skin:     General: Skin is warm and dry.      Findings: No rash.      Nails: There is no clubbing.   Neurological:      Mental Status: She is alert.      Cranial Nerves: No cranial nerve deficit.      Sensory: No sensory deficit.   Psychiatric:         Speech: Speech normal.         Behavior: Behavior normal. Behavior is cooperative.         Thought Content: Thought content normal.     I reviewed all labs from [facility] that were done recently. She does not need a lipid as her last one was very well  controlled.  We can recheck in a couple of years.  Assessment:     1. Annual physical exam      Plan:   I am having Arleen Saavedra maintain her cyclobenzaprine, calcium carb/vit D3/minerals (CALTRATE 600+D PLUS MINERALS ORAL), diclofenac sodium, ibuprofen, CitruceL, baclofen, hydroxychloroquine, RESTASIS, spironolactone, tretinoin, clindamycin, bimatoprost, Lacto no.76/Bifido/FOS/larch (WOMEN'S PROBIOTIC ORAL), and aspirin.  No problem-specific Assessment & Plan notes found for this encounter.      No follow-ups on file.    Arleen was seen today for annual exam.    Diagnoses and all orders for this visit:    Annual physical exam  -     Cologuard Screening (Multitarget Stool DNA); Future  -     Cologuard Screening (Multitarget Stool DNA)         The patient was instructed to stop the following meds:  There are no discontinued medications.  Orders Placed This Encounter   Procedures    Cologuard Screening (Multitarget Stool DNA)     Standing Status:   Future     Number of Occurrences:   1     Standing Expiration Date:   8/6/2025       Medication List with Changes/Refills   Current Medications    ASPIRIN 81 MG CHEW    Take 81 mg by mouth once daily.    BACLOFEN (LIORESAL) 10 MG TABLET    Take 1 tablet by mouth 3 (three) times daily.    BIMATOPROST (LATISSE) 0.03 % OPHTHALMIC SOLUTION    Place 1 Application into both eyes every evening. Place one drop on applicator and apply evenly along the skin of the upper eyelid at base of eyelashes once daily at bedtime; repeat procedure for second eye (use a clean applicator).    CALCIUM CARB/VIT D3/MINERALS (CALTRATE 600+D PLUS MINERALS ORAL)        CLINDAMYCIN (CLEOCIN T) 1 % EXTERNAL SOLUTION    Apply topically 2 (two) times daily. Spot treatment    CYCLOBENZAPRINE (FLEXERIL) 10 MG TABLET    TAKE 1 TABLET NIGHTLY    DICLOFENAC SODIUM (SOLARAZE) 3 % GEL    SMARTSIG:Sparingly Topical Twice Daily    HYDROXYCHLOROQUINE (PLAQUENIL) 200 MG TABLET    Take 200 mg by mouth 2 (two)  times daily.    IBUPROFEN (ADVIL,MOTRIN) 800 MG TABLET    Take 1 tablet by mouth every 8 (eight) hours as needed.    LACTO NO.76/BIFIDO/FOS/LARCH (WOMEN'S PROBIOTIC ORAL)        METHYLCELLULOSE, LAXATIVE, (CITRUCEL) 500 MG TAB        RESTASIS 0.05 % OPHTHALMIC EMULSION    Place 1 drop into both eyes 2 (two) times daily.    SPIRONOLACTONE (ALDACTONE) 100 MG TABLET    Take 1 tablet (100 mg total) by mouth once daily.    TRETINOIN (RETIN-A) 0.025 % CREAM    Apply pea sized amount to entire face QHS as tolerated.      Medication List with Changes/Refills   Current Medications    ASPIRIN 81 MG CHEW    Take 81 mg by mouth once daily.       Start Date: --        End Date: --    BACLOFEN (LIORESAL) 10 MG TABLET    Take 1 tablet by mouth 3 (three) times daily.       Start Date: 5/5/2023  End Date: --    BIMATOPROST (LATISSE) 0.03 % OPHTHALMIC SOLUTION    Place 1 Application into both eyes every evening. Place one drop on applicator and apply evenly along the skin of the upper eyelid at base of eyelashes once daily at bedtime; repeat procedure for second eye (use a clean applicator).       Start Date: 1/5/2024  End Date: --    CALCIUM CARB/VIT D3/MINERALS (CALTRATE 600+D PLUS MINERALS ORAL)           Start Date: 6/1/2021  End Date: --    CLINDAMYCIN (CLEOCIN T) 1 % EXTERNAL SOLUTION    Apply topically 2 (two) times daily. Spot treatment       Start Date: 1/5/2024  End Date: --    CYCLOBENZAPRINE (FLEXERIL) 10 MG TABLET    TAKE 1 TABLET NIGHTLY       Start Date: 12/1/2017 End Date: --    DICLOFENAC SODIUM (SOLARAZE) 3 % GEL    SMARTSIG:Sparingly Topical Twice Daily       Start Date: 7/6/2022  End Date: --    HYDROXYCHLOROQUINE (PLAQUENIL) 200 MG TABLET    Take 200 mg by mouth 2 (two) times daily.       Start Date: --        End Date: --    IBUPROFEN (ADVIL,MOTRIN) 800 MG TABLET    Take 1 tablet by mouth every 8 (eight) hours as needed.       Start Date: 1/24/2021 End Date: --    LACTO NO.76/BIFIDO/FOS/LARCH (WOMEN'S PROBIOTIC  ORAL)           Start Date: 2/7/2024  End Date: --    METHYLCELLULOSE, LAXATIVE, (CITRUCEL) 500 MG TAB           Start Date: 5/22/2023 End Date: --    RESTASIS 0.05 % OPHTHALMIC EMULSION    Place 1 drop into both eyes 2 (two) times daily.       Start Date: 11/8/2023 End Date: --    SPIRONOLACTONE (ALDACTONE) 100 MG TABLET    Take 1 tablet (100 mg total) by mouth once daily.       Start Date: 1/5/2024  End Date: 1/4/2025    TRETINOIN (RETIN-A) 0.025 % CREAM    Apply pea sized amount to entire face QHS as tolerated.       Start Date: 1/5/2024  End Date: --

## 2024-05-29 ENCOUNTER — PATIENT MESSAGE (OUTPATIENT)
Dept: FAMILY MEDICINE | Facility: CLINIC | Age: 54
End: 2024-05-29
Payer: COMMERCIAL

## 2024-05-29 DIAGNOSIS — Z12.11 COLON CANCER SCREENING: Primary | ICD-10-CM

## 2024-06-13 ENCOUNTER — OFFICE VISIT (OUTPATIENT)
Dept: SURGERY | Facility: CLINIC | Age: 54
End: 2024-06-13
Payer: COMMERCIAL

## 2024-06-13 DIAGNOSIS — D05.11 BREAST NEOPLASM, TIS (DCIS), RIGHT: Primary | ICD-10-CM

## 2024-06-13 PROCEDURE — 99205 OFFICE O/P NEW HI 60 MIN: CPT | Mod: S$GLB,,, | Performed by: SURGERY

## 2024-06-13 NOTE — PROGRESS NOTES
Breast Surgical Oncology  [place]    Date of Service: 2024    SUBJECTIVE:   Chief complaint: right breast cancer    HISTORY OF PRESENT ILLNESS:   Arleen Saavedra is a 53 y.o. female who is kindly referred by Dr. Stephon Srinivasan for right breast cancer.    A right breast abnormality was identified on routine screening mammography.  Focused mammographic evaluation at [facility] in 2024 revealed pleomorphic calcifications in the right lateral breast posterior depth. Stereotactic biopsy confirmed hormone receptor positive noninvasive breast cancer. She denies breast concerns such as pain, masses, skin changes, nipple discharge, nipple retraction or lumps under the arm.  She denies prior breast surgery or biopsy.     Her breast cancer risk factor profile is as follows: Menarche at 15, Menopause at N/A.  She is . Age at first live birth was 31. Family history of cancer is as follows: none reported    FAMILY HISTORY:     Family History   Problem Relation Name Age of Onset    Stroke Mother      Hypertension Mother      Seizures Mother      Coronary artery disease Father      Hyperlipidemia Father      Hypertension Maternal Grandfather      Diabetes Neg Hx      Cancer Neg Hx          PAST MEDICAL HISTORY:     Past Medical History:   Diagnosis Date    Darier disease     Fibromyalgia     Rheumatoid arthritis     Rheumatoid arthritis involving both shoulders with negative rheumatoid factor 2018    She has seronegative rheumatoid arthritis and fibromyalgia. Reports soreness in the upper shoulders and neck which she has done well within the past with plaquenil which she has been on since around  and is followed by Dr. Martin.         SURGICAL HISTORY:   No past surgical history on file.    SOCIAL HISTORY:     Social History     Tobacco Use    Smoking status: Former     Types: Cigarettes    Smokeless tobacco: Never        MEDICATIONS/ALLERGIES:     Current Outpatient Medications:     aspirin 81  MG Chew, Take 81 mg by mouth once daily., Disp: , Rfl:     baclofen (LIORESAL) 10 MG tablet, Take 1 tablet by mouth 3 (three) times daily., Disp: , Rfl:     bimatoprost (LATISSE) 0.03 % ophthalmic solution, Place 1 Application into both eyes every evening. Place one drop on applicator and apply evenly along the skin of the upper eyelid at base of eyelashes once daily at bedtime; repeat procedure for second eye (use a clean applicator)., Disp: 5 mL, Rfl: 1    calcium carb/vit D3/minerals (CALTRATE 600+D PLUS MINERALS ORAL), , Disp: , Rfl:     clindamycin (CLEOCIN T) 1 % external solution, Apply topically 2 (two) times daily. Spot treatment, Disp: 60 mL, Rfl: 1    cyclobenzaprine (FLEXERIL) 10 MG tablet, TAKE 1 TABLET NIGHTLY, Disp: , Rfl:     diclofenac sodium (SOLARAZE) 3 % gel, SMARTSIG:Sparingly Topical Twice Daily, Disp: , Rfl:     hydroxychloroquine (PLAQUENIL) 200 mg tablet, Take 200 mg by mouth 2 (two) times daily., Disp: , Rfl:     ibuprofen (ADVIL,MOTRIN) 800 MG tablet, Take 1 tablet by mouth every 8 (eight) hours as needed., Disp: , Rfl:     Lacto no.76/Bifido/FOS/larch (WOMEN'S PROBIOTIC ORAL), , Disp: , Rfl:     methylcellulose, laxative, (CITRUCEL) 500 mg Tab, , Disp: , Rfl:     RESTASIS 0.05 % ophthalmic emulsion, Place 1 drop into both eyes 2 (two) times daily., Disp: , Rfl:     spironolactone (ALDACTONE) 100 MG tablet, Take 1 tablet (100 mg total) by mouth once daily., Disp: 90 tablet, Rfl: 3    tretinoin (RETIN-A) 0.025 % cream, Apply pea sized amount to entire face QHS as tolerated., Disp: 45 g, Rfl: 6  Review of patient's allergies indicates:  No Known Allergies    REVIEW OF SYSTEMS:   I have reviewed 12 systems, including 2 points per system. Pertinent reported positives are: easy bruising, joint stiffness, back pain    PHYSICAL EXAM:   General: The patient appears well and is in no acute distress.     Claudia Roberto MA was present as a chaperone for the examination.   BREAST EXAM  No  Asymmetry  Grade I ptosis  Right:  - Mass: No  - Skin change: No  - Nipple Discharge: No  - Nipple retraction: No  - Axillary LAD: No  Left:   - Mass: No  - Skin change: No  - Nipple Discharge: No  - Nipple retraction: No  - Axillary LAD: No    IMAGING:   MAMMO DIGITAL DIAGNOSTIC RIGHT  Order: [id number]  Narrative    REASON FOR EXAM: [N63.10]-Unspecified lump in the right breast, unspecified quadrant    TECHNICAL FACTORS: Digital mammography lateral and magnification views were obtained of the right breast.    CLINICAL INFORMATION: According to the National Cancer [place] risk assessment total, her lifetime breast cancer risk is 9%.    COMPARISON: March 22, 2024    FINDINGS: The right breast is heterogeneously dense, which may obscure small masses. The grouping of microcalcifications within the right lateral breast posterior depth exhibit a pleomorphic morphology.    IMPRESSION:  BI-RADS 4 - Suspicious Abnormality. Subcategory 4B: Moderate Suspicion for Malignancy.    The grouping of pleomorphic microcalcifications within the right lateral breast posterior depth is suspicious of malignancy. Surgical consultation followed by stereotactic core biopsy is recommended for further evaluation.    BREAST DENSITY: Heterogeneously Dense    Electronically signed by Jose Saavedra MD on 4/11/2024 3:13 PM  Exam End: 04/11/24 15:05       PATHOLOGY:     Surgical Pathology SEE BELOW Abnormal    Comment:                                            ADDENDUM                                 Department of Pathology                                             SURGICAL PATHOLOGY REPORT                Zain Robertson MD                                                                                      Medical Director                                                                                      Phone: [phone]                                                                                      Fax: [phone]  Name:        VANDANA SAAVEDRA  Case#:      TC-                                                Date Collected:  2024, 10:25  :        1970   Age: 53 Y   Sex: F                            Date Received:   2024, 12:03  MR#:        [id number]                                                    Service:         [facility]    Room#:  Adm. :    MARY SOLIMAN                                               Acct#:           [id number]  Req. Dr:    MARY SOLIMAN  Copies to:  GOVIND RIBERA, OB/GYN  .  .  ADDENDUM 1:   Estrogen Receptors and Progesterone Receptor studies have been performed by [facility] with  adequate controls.  Estrogen receptor is resulted as greater than 90% strong positive.  Progesterone receptor is  resulted as greater than 90% strong positive.  Please see [facility] report in epic media for further details.  Electronically Signed By:  ADIN BUCKNER  Reported: 2024  07:47  .  FINAL DIAGNOSIS:  A.  Right breast cores with calcifications: Ductal carcinoma in situ (DCIS), intermediate grade, with comedonecrosis.      Ki-67 index is <1%.  Calcifications are identified.  No evidence of invasive malignancy.  B.  Right breast, rest of cores: Focal ductal carcinoma in situ (DCIS), solid pattern.  .  Comment:  Second pathologist has reviewed this case and concurs with the above diagnostic impressions.  ER/MD studies have been ordered and will be reported separately when available.  Result reported to Dr. Karely Davison' nurse at 4:35 PM, May 6, 2024.  Requested report not to be signed out until  May 7, 2024.  Electronically Signed By:  JACQUES OBRIEN  Reported: 2024  07:54  .       ASSESSMENT:     1. Breast neoplasm, Tis (DCIS), right          PLAN:     Vandana Saavedra is a 53 y.o. female who is new diagnosis of Stage 0 (Tis N0 Mx) RIGHT Breast Ductal  Carcinoma In Situ, Grade 2, ER 90%, MD 90%. I have reviewed her imaging and pathology reports with her and I have provided  her with copies. Today, we reviewed reviewed the guidelines of the National Comprehensive Cancer Network for her diagnosis.    An internal review of her radiology and pathology will be done. We discussed that this will ensure that her surgical choice is appropriate. Because of her age, unknown paternal family history, and diagnosis, I recommend genetic testing and counseling.     We have discussed the surgical choices of lumpectomy with radiation and mastectomy with or without radiation.  I have explained that the survival is the same, regardless of the surgery chosen.  We have discussed that the local recurrence rate following mastectomy is 2-5%.  I have explained that the local recurrence rate was slightly higher following breast conservation in the large trials that compared mastectomy and breast conservation. However, with current medical therapies, local recurrence has been reduced to as low as 6%. I did review with her the general schedule and side effects of radiation. She will be referred to radiation oncology. We briefly discussed reconstruction options, and the [facility] breast cancer treatment brochure was provided.    We reviewed the need for sentinel lymph node biopsy to further stage the axilla if she chooses a mastectomy. If magtrace is implemented by the time of her surgery, she will utilize this tracer and defer sentinel lymph node biopsy.        Because her cancer is hormone receptor positive, she will be recommended for endocrine therapy. She understands that she will be referred to a medical oncologist to discuss these points further.    She desires breast conserving surgery if her genetics and imaging review deems it appropriate.     I have provided her with general information regarding the supportive services available here including oncology social work, patient navigation, nutritional services, preoperative and postoperative physical therapy programs, and genetic counseling.      I spent a total  of 60 minutes on this visit. This includes face to face time and non-face to face time preparing to see the patient (eg, review of tests), obtaining and/or reviewing separately obtained history, documenting clinical information in the electronic or other health record, independently interpreting results and communicating results to the patient/family/caregiver, or care coordinator.          Swetha Hodges M.D.

## 2024-06-13 NOTE — PROGRESS NOTES
Genetic Note    Pt is referred by Swetha Hodges MD for the genetic counseling and testing    New diagnosis of Stage 0 (Tis N0 Mx) RIGHT Breast Ductal  Carcinoma In Situ, Grade 2, ER 90%, AK 90%     FH: no family history of breast cancer. No other cancers.     Germline cancer-genetic testing is the testing of genes associated with cancer, known as cancer susceptibility genes.  Just as these genes are inherited from parents, mutations in these genes can be inherited, as well.  A mutation in a cancer susceptibility gene adversely affects the gene's ability to prevent cancer; therefore, carriers of cancer susceptibility gene mutations may be at increased risk for certain cancers.     A small percentage of cancers are caused by an cancer susceptibility gene mutation, meaning the cancer is genetic/hereditary; rather, most cancers are sporadic.  Causes of sporadic cancers may include environmental risk factors, lifestyle risk factors, and non-modifiable risk factors.  It is important to note that members of a family often share not only their genetics but also risk factors including environmental and lifestyle risk factors.     Results of genetic testing include positive, negative, and variant of unknown significance (VUS).  A positive result indicates the presence of at least one clinically significant mutation, and the patient's specific cancer risks vary depending upon the tumor-suppressor gene(s) in which there is/are a mutation(s).  With a positive result, in some cases, depending upon the specific result and the patient's clinical history, modified management may be recommended, including measures for risk reduction and/or surveillance; however, modified management is not always an option.  A negative result indicates that no clinically significant mutations were identified in the gene(s) tested.  A VUS indicates that there is not presently enough data for the laboratory to make a determination as to whether the  variant is clinically significant; VUSs are not typically acted upon clinically.       The ability to interpret the meaning of a negative genetic testing result in genes associated with cancer with which the patient has not personally been affected, when done prior to testing the appropriate affected relative(s), is significantly limited.  A negative result in the patient does not indicate that she cannot develop cancer, and, in fact, the patient may even be at increased risk for cancer based on shared risk factors with affected relatives.  The most informative candidates for initial genetic testing in a family are those who have been affected with cancer.     Modified management may also be recommended, even with a result of no or unknown significance, based upon risk assessment that incorporates the family history.       Sometimes, depending upon the genetic testing result and the cancer diagnosis, additional/modified treatments may be an option, though this is not guaranteed.     If pt tests positive for a mutation, her first-degree relatives would each have a 50% chance of having the same mutation, and other, more distantly related blood-relatives would also be at risk of having the same mutation.       The Genetic Information Nondiscrimination Act (EMMETT) is U.S. federal legislation that provides some protections against use of an individual's genetic information by their health insurer and by their employer.  Title I of EMMETT prohibits most health insurers from utilizing an individual's genetic information to make decisions regarding insurance eligibility, coverage, underwriting, or premium charges.  Title II of EMMETT prohibits covered entities, which include employers, employment agencies, labor organizations, and joint labor-management training and apprenticeship programs, from requesting, requiring, or disclosing the genetic information of employees and applicants.  EMMETT also prohibits health insurers and  employers from asking or mandating that an individual take a genetic test.  EMMETT does not protect individuals from genetic discrimination toward health insurance obtained through a job with the  or through the Federal Employees Health Benefits Plan; from genetic discrimination by employers with fewer than 15 employees; or from genetic discrimination by any other type of policies/entities, including but not limited to life insurance, disability insurance, long-term care insurance,  benefits, and  Health Services benefits.     An outside laboratory would perform the testing after a blood sample is collected.  With genetic testing, there is a potential for the patient to incur out-of-pocket costs.  Results can take several weeks.  Post-test genetic counseling can be conducted once the genetic testing results are available.     Questions were encouraged and answered to the patient's satisfaction, and she verbalized understanding of information and agreement with the plan.        ASSESSMENT/PLAN      A cancer-genetic evaluation and pre-test genetic counseling were conducted. Based on the information provided by pt and family, her personal and/or family history is suggestive of a potential potential hereditary predisposition to cancer. The recommendation is to proceed with germline testing to include the genes commonly associated with hereditary breast cancer, including BRCA1 and BRCA2.  Pt was given the option of proceeding with testing now, deferring testing to a later date, or declining testing and opted to proceed.     Genetic test: [facility] STAT breast cancer and 70 gene multicancer panel   Collection: By [facility] today    Consent: The patient is to provide her written informed consent.    Results are expected approximately 2-3 weeks after the genetic testing laboratory receives the specimen.    Pt will be contacted by [employer] if there are any issues with cost coverage or out of pocket  cost     Encounter for non-procreative genetic counseling  Family history of no cancer  Personal history of right breast cancer  Proceed with germline genetic testing.  Follow up with results.     REFERENCES      National Comprehensive Cancer Network (NCCN). (2021). Genetic/familial high-risk assessment: Breast, ovarian, and pancreatic. NCCN Clinical Practice Guidelines in Oncology (NCCN Guidelines), Version 1.2022.  National Comprehensive Cancer Network (NCCN). (2021). Genetic/familial high-risk assessment: Colorectal. NCCN Clinical Practice Guidelines in Oncology (NCCN Guidelines), Version 1.2021.  National Comprehensive Cancer Network (NCCN). (2021). Prostate cancer. NCCN Clinical Practice Guidelines in Oncology (NCCN Guidelines), Version 1.2022.    60 min spent reviewing pt record, completing application forms for testing, explaining risk vs benefits of testing and signing consent and answering all questions related to her recent diagnosis and planned treatment

## 2024-06-14 NOTE — NURSING
Nurse Navigator Note:     Met with patient during her consult with Dr. Hodges.  Patient and I reviewed the information she discussed with Dr. Hodges, including treatment options, referrals, diagnosis, and future plans for workup. Patient and I went through the new patient booklet, I explained some of the information and why it is provided.   Specifically discussed shared services listed in booklet and how to request referrals. Discussed the role of the nurse navigator and how I can help her through her breast cancer journey.     Patient was given a copy of her appointments, Dr. Hodges's card, and my card. Encouraged her to call me if she has any questions or concerns or would like to schedule any additional appointments. Patient verbalized understanding of all information.      External pathology requested from [facility]. Imaging disks brought to radiology to upload into the chart- will facilitate internal review of MRI for any needed workup imaging. Pt scheduled for genetic testing 6/20 with Isela Jackson. Due to patient's insurance, Dr. Hodges and [facility] is approved for surgery only- pt plans to have medical oncology and radiation oncology at [facility] and will let me know specific providers once she speaks with her insurance company.       Oncology Navigation   Intake  Date of Diagnosis: 05/03/24  Cancer Type: Breast  Type of Referral: External  Date of Referral: 06/06/24  Initial Nurse Navigator Contact: 06/06/24  Referral to Initial Contact Timeline (days): 0  First Appointment Available: 06/13/24  Appointment Date: 06/13/24  First Available Date vs. Scheduled Date (days): 0  Multiple appointments: No     Treatment  Current Status: Staging work-up    Surgery: Planned  Surgical Oncologist: Swetha Hodges MD  Type of Surgery: Right partial mastectomy  Consult Date: 06/13/24                ER: Positive  AZ: Positive       Support Systems: Children; Family members  Barriers of  "Care: Barriers to Care "Assessment completed-no barriers noted"     Acuity      Follow Up  No follow-ups on file.       "

## 2024-06-19 DIAGNOSIS — D05.11 BREAST NEOPLASM, TIS (DCIS), RIGHT: Primary | ICD-10-CM

## 2024-06-19 NOTE — NURSING
"External images received- MD notified and she had Dr. Johnson review them to confirm calcium spans 1cm. External pathology material received via [employer], [employer] order placed and checked into the lab at [facility].     Oncology Navigation   Intake  Date of Diagnosis: 05/03/24  Cancer Type: Breast  Type of Referral: External  Date of Referral: 06/06/24  Initial Nurse Navigator Contact: 06/06/24  Referral to Initial Contact Timeline (days): 0  First Appointment Available: 06/13/24  Appointment Date: 06/13/24  First Available Date vs. Scheduled Date (days): 0  Multiple appointments: No     Treatment  Current Status: Staging work-up    Surgery: Planned  Surgical Oncologist: Swetha Hodges MD  Type of Surgery: Right partial mastectomy  Consult Date: 06/13/24                ER: Positive  SC: Positive       Support Systems: Children; Family members  Barriers of Care: Barriers to Care "Assessment completed-no barriers noted"     Acuity      Follow Up  No follow-ups on file.       "

## 2024-06-20 ENCOUNTER — OFFICE VISIT (OUTPATIENT)
Dept: SURGERY | Facility: CLINIC | Age: 54
End: 2024-06-20
Payer: COMMERCIAL

## 2024-06-20 DIAGNOSIS — Z71.89 COUNSELING ON HEALTH PROMOTION AND DISEASE PREVENTION: ICD-10-CM

## 2024-06-20 DIAGNOSIS — Z71.89 COUNSELING AND COORDINATION OF CARE: ICD-10-CM

## 2024-06-20 DIAGNOSIS — Z13.79 GENETIC TESTING: ICD-10-CM

## 2024-06-20 DIAGNOSIS — D05.11 BREAST NEOPLASM, TIS (DCIS), RIGHT: Primary | ICD-10-CM

## 2024-06-20 PROCEDURE — 99999 PR PBB SHADOW E&M-EST. PATIENT-LVL II: CPT | Mod: PBBFAC,,, | Performed by: NURSE PRACTITIONER

## 2024-06-20 PROCEDURE — 1159F MED LIST DOCD IN RCRD: CPT | Mod: CPTII,S$GLB,, | Performed by: NURSE PRACTITIONER

## 2024-06-20 PROCEDURE — 1160F RVW MEDS BY RX/DR IN RCRD: CPT | Mod: CPTII,S$GLB,, | Performed by: NURSE PRACTITIONER

## 2024-06-20 PROCEDURE — 99215 OFFICE O/P EST HI 40 MIN: CPT | Mod: S$GLB,,, | Performed by: NURSE PRACTITIONER

## 2024-06-27 ENCOUNTER — PATIENT MESSAGE (OUTPATIENT)
Dept: SURGERY | Facility: CLINIC | Age: 54
End: 2024-06-27
Payer: COMMERCIAL

## 2024-07-03 ENCOUNTER — PATIENT MESSAGE (OUTPATIENT)
Dept: SURGERY | Facility: CLINIC | Age: 54
End: 2024-07-03
Payer: COMMERCIAL

## 2024-07-03 ENCOUNTER — DOCUMENTATION ONLY (OUTPATIENT)
Dept: SURGERY | Facility: CLINIC | Age: 54
End: 2024-07-03
Payer: COMMERCIAL

## 2024-07-03 ENCOUNTER — TELEPHONE (OUTPATIENT)
Dept: SURGERY | Facility: CLINIC | Age: 54
End: 2024-07-03
Payer: COMMERCIAL

## 2024-07-03 DIAGNOSIS — D05.11 DUCTAL CARCINOMA IN SITU OF RIGHT BREAST: ICD-10-CM

## 2024-07-03 DIAGNOSIS — D05.11 BREAST NEOPLASM, TIS (DCIS), RIGHT: Primary | ICD-10-CM

## 2024-07-03 DIAGNOSIS — R92.8 ABNORMAL MRI, BREAST: ICD-10-CM

## 2024-07-03 RX ORDER — SODIUM CHLORIDE 9 MG/ML
INJECTION, SOLUTION INTRAVENOUS CONTINUOUS
OUTPATIENT
Start: 2024-07-03

## 2024-07-03 RX ORDER — CEFAZOLIN SODIUM 2 G/50ML
2 SOLUTION INTRAVENOUS
OUTPATIENT
Start: 2024-07-03

## 2024-07-03 NOTE — TELEPHONE ENCOUNTER
Contacted pt regarding treatment plan- genetic negative so ready to plan surgery. Pt wants breast conservation- we discussed that she would need to be seen in the clinic again before surgery with Dr. Hodges, complete pre op testing, get second look US of the left breast and have markers placed on the right breast. Marker placement date requested with [facility]. Pt states she would like surgery to occur after the last week of July. We discussed surgery dates of 8/7 and 8/14- pt to speak with her employer on 7/8 and contact me back to coordinate a plan.

## 2024-07-05 DIAGNOSIS — Z01.818 PRE-OP TESTING: Primary | ICD-10-CM

## 2024-07-16 PROBLEM — Z13.71 BRCA NEGATIVE: Status: ACTIVE | Noted: 2024-07-16

## 2024-07-17 ENCOUNTER — PATIENT MESSAGE (OUTPATIENT)
Dept: FAMILY MEDICINE | Facility: CLINIC | Age: 54
End: 2024-07-17
Payer: COMMERCIAL

## 2024-07-22 ENCOUNTER — TELEPHONE (OUTPATIENT)
Dept: FAMILY MEDICINE | Facility: CLINIC | Age: 54
End: 2024-07-22
Payer: COMMERCIAL

## 2024-07-22 NOTE — TELEPHONE ENCOUNTER
----- Message from Nolvia Kaur sent at 7/22/2024 10:04 AM CDT -----  Contact: [phone]  Type:  Patient Returning Call    Who Called:Raegan with Dr Bernabe and Dr Peterson [phone] fax [phone]  Who Left Message for Patient:  Does the patient know what this is regarding?:did you received her clearance for surgery   Would the patient rather a call back or a response via [facility]? Call back  Best Call Back Number: [phone]  Additional Information: [id number]

## 2024-07-23 ENCOUNTER — OFFICE VISIT (OUTPATIENT)
Dept: FAMILY MEDICINE | Facility: CLINIC | Age: 54
End: 2024-07-23
Payer: COMMERCIAL

## 2024-07-23 ENCOUNTER — TELEPHONE (OUTPATIENT)
Dept: FAMILY MEDICINE | Facility: CLINIC | Age: 54
End: 2024-07-23
Payer: COMMERCIAL

## 2024-07-23 VITALS
DIASTOLIC BLOOD PRESSURE: 85 MMHG | HEART RATE: 72 BPM | WEIGHT: 135.5 LBS | RESPIRATION RATE: 18 BRPM | HEIGHT: 66 IN | BODY MASS INDEX: 21.78 KG/M2 | OXYGEN SATURATION: 98 % | SYSTOLIC BLOOD PRESSURE: 135 MMHG

## 2024-07-23 DIAGNOSIS — Z01.818 PREOPERATIVE CLEARANCE: Primary | ICD-10-CM

## 2024-07-23 PROCEDURE — 99214 OFFICE O/P EST MOD 30 MIN: CPT | Mod: S$GLB,,, | Performed by: PHYSICIAN ASSISTANT

## 2024-07-23 PROCEDURE — 3008F BODY MASS INDEX DOCD: CPT | Mod: CPTII,S$GLB,, | Performed by: PHYSICIAN ASSISTANT

## 2024-07-23 PROCEDURE — 3075F SYST BP GE 130 - 139MM HG: CPT | Mod: CPTII,S$GLB,, | Performed by: PHYSICIAN ASSISTANT

## 2024-07-23 PROCEDURE — 99999 PR PBB SHADOW E&M-EST. PATIENT-LVL IV: CPT | Mod: PBBFAC,,, | Performed by: PHYSICIAN ASSISTANT

## 2024-07-23 PROCEDURE — 3079F DIAST BP 80-89 MM HG: CPT | Mod: CPTII,S$GLB,, | Performed by: PHYSICIAN ASSISTANT

## 2024-07-23 PROCEDURE — 1160F RVW MEDS BY RX/DR IN RCRD: CPT | Mod: CPTII,S$GLB,, | Performed by: PHYSICIAN ASSISTANT

## 2024-07-23 PROCEDURE — 1159F MED LIST DOCD IN RCRD: CPT | Mod: CPTII,S$GLB,, | Performed by: PHYSICIAN ASSISTANT

## 2024-07-23 NOTE — TELEPHONE ENCOUNTER
Pt has appointment scheduled to obtain clearance.       Next Appt this Specialty: With Family Medicine (Brittany Graham PA-C)  07/23/2024 at 2:40 PM

## 2024-07-23 NOTE — TELEPHONE ENCOUNTER
----- Message from Diamone Speed sent at 7/23/2024  8:29 AM CDT -----  Regarding: Dr Edward office  Type: Patient Call Back       Who called:Dr Vincent office        What is the request in detail:is calling to get a surgical clearance        Can the clinic reply by [facility]? Yes       Would the patient rather a call back or a response via [facility]? Call back      Best call back number: [phone]

## 2024-07-23 NOTE — PROGRESS NOTES
Assessment/Plan:    Problem List Items Addressed This Visit    None  Visit Diagnoses       Preoperative clearance    -  Primary    Relevant Orders    IN OFFICE EKG 12-LEAD (to Muse)    CBC Auto Differential    Comprehensive Metabolic Panel    X-Ray Chest PA And Lateral           Assessment:   54 y.o. female with planned surgery R breast lumpectomy with radar localization.    Known risk factors for perioperative complications: None.    Difficulty with intubation is not anticipated.    Cardiac Risk Estimation: per the Revised Cardiac Risk Index (Circ. 100:1043, 1999), the patient's risk factors for cardiac complications include  none , putting her in: RCI RISK CLASS I (0 risk factors, risk of major cardiac compl. appr. 0.5%).    Current medications which may produce withdrawal symptoms if withheld perioperatively: none.     Plan:   1. Preoperative workup as follows chest x-ray, ECG, hemoglobin, hematocrit, electrolytes, creatinine, glucose, liver function studies.  2. Change in medication regimen before surgery: none, continue medication regimen including morning of surgery, with sip of water.  3. Prophylaxis for cardiac events with perioperative beta-blockers: should be considered, specific regimen per anesthesia.  4. Invasive hemodynamic monitoring perioperatively: at the discretion of anesthesiologist.  5. Deep vein thrombosis prophylaxis postoperatively:regimen to be chosen by surgical team.  6. Surveillance for postoperative MI with ECG immediately postoperatively and on postoperative days 1 and 2 AND troponin levels 24 hours postoperatively and on day 4 or hospital discharge (whichever comes first): at the discretion of anesthesiologist.  7. Other measures: none. Will provide clearance once reviewing results.   7/26/24: Labs (CBC and CMP) stable. EKG and CXR unremarkable. Patient cleared for surgery from a primary care perspective.     Follow up for with PCP.    Brittany Graham  MAYRA  _____________________________________________________________________________________________________________________________________________________    CC: preop clearance    HPI: Patient is in clinic today as an established patient here for preop clearance to have a R breast lumpectomy with radar localization. The physician that is performing the surgery is Dr. Bernabe at [facility]. The surgery is being planned for TBD (mid August). The patient states that there has not been previous problems with sedation. She has had prior surgeries in the past with no adverse effects from anesthesia. She is not currently taking a blood thinner. She has no history of blood clots or bleeding disorders. Patient is a nonsmoker. No history of ZOEY. No history of MI, PE, DVT, arrhythmia, CHF, and/or COPD. No other complaints today.     Past Medical History:   Diagnosis Date    Darier disease     Fibromyalgia     Rheumatoid arthritis     Rheumatoid arthritis involving both shoulders with negative rheumatoid factor 2018    She has seronegative rheumatoid arthritis and fibromyalgia. Reports soreness in the upper shoulders and neck which she has done well within the past with plaquenil which she has been on since around  and is followed by Dr. Martin.       History reviewed. No pertinent surgical history.  Review of patient's allergies indicates:  No Known Allergies  Social History     Tobacco Use    Smoking status: Former     Current packs/day: 0.00     Average packs/day: 0.3 packs/day for 5.0 years (1.3 ttl pk-yrs)     Types: Cigarettes     Start date: 1990     Quit date: 1995     Years since quittin.0    Smokeless tobacco: Never   Substance Use Topics    Alcohol use: Yes     Alcohol/week: 1.0 standard drink of alcohol     Types: 1 Glasses of wine per week    Drug use: No     Family History   Problem Relation Name Age of Onset    Stroke Mother Patti Duncan     Hypertension Mother  Patti Candebat     Seizures Mother Patti Song     COPD Mother Patti Song     Coronary artery disease Father Donn Song     Hyperlipidemia Father Donn Song     Heart disease Father Donn Song     Hypertension Maternal Grandfather Eliezer Julien     Arthritis Paternal Grandmother Dahlia Song     Heart disease Paternal Grandmother Dahlia Song     Diabetes Neg Hx      Cancer Neg Hx       Current Outpatient Medications on File Prior to Visit   Medication Sig Dispense Refill    baclofen (LIORESAL) 10 MG tablet Take 1 tablet by mouth 3 (three) times daily.      bimatoprost (LATISSE) 0.03 % ophthalmic solution Place 1 Application into both eyes every evening. Place one drop on applicator and apply evenly along the skin of the upper eyelid at base of eyelashes once daily at bedtime; repeat procedure for second eye (use a clean applicator). 5 mL 1    calcium carb/vit D3/minerals (CALTRATE 600+D PLUS MINERALS ORAL)       clindamycin (CLEOCIN T) 1 % external solution Apply topically 2 (two) times daily. Spot treatment 60 mL 1    cyclobenzaprine (FLEXERIL) 10 MG tablet TAKE 1 TABLET NIGHTLY      diclofenac sodium (SOLARAZE) 3 % gel SMARTSIG:Sparingly Topical Twice Daily      hydroxychloroquine (PLAQUENIL) 200 mg tablet Take 200 mg by mouth 2 (two) times daily.      ibuprofen (ADVIL,MOTRIN) 800 MG tablet Take 1 tablet by mouth every 8 (eight) hours as needed.      Lacto no.76/Bifido/FOS/larch (WOMEN'S PROBIOTIC ORAL)       methylcellulose, laxative, (CITRUCEL) 500 mg Tab       RESTASIS 0.05 % ophthalmic emulsion Place 1 drop into both eyes 2 (two) times daily.      tretinoin (RETIN-A) 0.025 % cream Apply pea sized amount to entire face QHS as tolerated. 45 g 6    [DISCONTINUED] aspirin 81 MG Chew Take 81 mg by mouth once daily.      [DISCONTINUED] spironolactone (ALDACTONE) 100 MG tablet Take 1 tablet (100 mg total) by mouth once daily. 90 tablet 3     No current facility-administered  "medications on file prior to visit.       Review of Systems   Constitutional:  Negative for chills, diaphoresis, fatigue and fever.   HENT:  Negative for congestion, ear pain, postnasal drip, sinus pain and sore throat.    Eyes:  Negative for pain and redness.   Respiratory:  Negative for cough, chest tightness and shortness of breath.    Cardiovascular:  Negative for chest pain and leg swelling.   Gastrointestinal:  Negative for abdominal pain, constipation, diarrhea, nausea and vomiting.   Genitourinary:  Negative for dysuria and hematuria.   Musculoskeletal:  Negative for arthralgias and joint swelling.   Skin:  Negative for rash.   Neurological:  Negative for dizziness, syncope and headaches.   Psychiatric/Behavioral:  Negative for dysphoric mood. The patient is not nervous/anxious.        Vitals:    07/23/24 1436   BP: 135/85   Pulse: 72   Resp: 18   SpO2: 98%   Weight: 61.5 kg (135 lb 8 oz)   Height: 5' 6" (1.676 m)       Wt Readings from Last 3 Encounters:   07/23/24 61.5 kg (135 lb 8 oz)   05/08/24 59.4 kg (131 lb)   01/05/24 61.2 kg (135 lb)       Physical Exam  Constitutional:       General: She is not in acute distress.     Appearance: Normal appearance. She is well-developed.   HENT:      Head: Normocephalic and atraumatic.   Eyes:      Conjunctiva/sclera: Conjunctivae normal.   Cardiovascular:      Rate and Rhythm: Normal rate and regular rhythm.      Pulses: Normal pulses.      Heart sounds: Normal heart sounds. No murmur heard.  Pulmonary:      Effort: Pulmonary effort is normal. No respiratory distress.      Breath sounds: Normal breath sounds.   Abdominal:      General: Bowel sounds are normal. There is no distension.      Palpations: Abdomen is soft.      Tenderness: There is no abdominal tenderness.   Musculoskeletal:         General: Normal range of motion.      Cervical back: Normal range of motion and neck supple.   Skin:     General: Skin is warm and dry.      Findings: No rash. "   Neurological:      General: No focal deficit present.      Mental Status: She is alert and oriented to person, place, and time.   Psychiatric:         Mood and Affect: Mood normal.         Behavior: Behavior normal.         Health Maintenance   Topic Date Due    Shingles Vaccine (1 of 2) Never done    Mammogram  05/03/2025    Colorectal Cancer Screening  06/21/2027    Lipid Panel  09/16/2027    TETANUS VACCINE  01/26/2028    Hepatitis C Screening  Completed

## 2024-07-24 ENCOUNTER — PATIENT MESSAGE (OUTPATIENT)
Dept: FAMILY MEDICINE | Facility: CLINIC | Age: 54
End: 2024-07-24
Payer: COMMERCIAL

## 2024-08-06 ENCOUNTER — PATIENT MESSAGE (OUTPATIENT)
Dept: SURGERY | Facility: CLINIC | Age: 54
End: 2024-08-06
Payer: COMMERCIAL

## 2024-09-11 ENCOUNTER — PATIENT MESSAGE (OUTPATIENT)
Dept: FAMILY MEDICINE | Facility: CLINIC | Age: 54
End: 2024-09-11
Payer: COMMERCIAL

## 2024-10-21 PROBLEM — Z13.71 BRCA NEGATIVE: Status: RESOLVED | Noted: 2024-07-16 | Resolved: 2024-10-21

## 2025-06-06 LAB — BCS RECOMMENDATION EXT: NORMAL

## 2025-06-24 ENCOUNTER — PATIENT OUTREACH (OUTPATIENT)
Dept: ADMINISTRATIVE | Facility: HOSPITAL | Age: 55
End: 2025-06-24
Payer: COMMERCIAL

## 2025-08-20 ENCOUNTER — PATIENT MESSAGE (OUTPATIENT)
Dept: ADMINISTRATIVE | Facility: HOSPITAL | Age: 55
End: 2025-08-20
Payer: COMMERCIAL